# Patient Record
Sex: MALE | Race: WHITE | NOT HISPANIC OR LATINO | Employment: OTHER | URBAN - METROPOLITAN AREA
[De-identification: names, ages, dates, MRNs, and addresses within clinical notes are randomized per-mention and may not be internally consistent; named-entity substitution may affect disease eponyms.]

---

## 2021-11-09 ENCOUNTER — HOSPITAL ENCOUNTER (OUTPATIENT)
Facility: CLINIC | Age: 66
End: 2021-11-09
Attending: ORTHOPAEDIC SURGERY | Admitting: ORTHOPAEDIC SURGERY
Payer: MEDICARE

## 2022-02-08 DIAGNOSIS — Z11.59 ENCOUNTER FOR SCREENING FOR OTHER VIRAL DISEASES: Primary | ICD-10-CM

## 2022-02-09 ENCOUNTER — TRANSFERRED RECORDS (OUTPATIENT)
Dept: MULTI SPECIALTY CLINIC | Facility: CLINIC | Age: 67
End: 2022-02-09
Payer: MEDICARE

## 2022-02-09 LAB — HBA1C MFR BLD: 6.9 %

## 2022-02-23 RX ORDER — CEFAZOLIN SODIUM/WATER 2 G/20 ML
2 SYRINGE (ML) INTRAVENOUS SEE ADMIN INSTRUCTIONS
Status: CANCELLED | OUTPATIENT
Start: 2022-02-23

## 2022-02-23 RX ORDER — ACETAMINOPHEN 325 MG/1
975 TABLET ORAL ONCE
Status: CANCELLED | OUTPATIENT
Start: 2022-02-23 | End: 2022-02-23

## 2022-02-23 RX ORDER — CEFAZOLIN SODIUM/WATER 2 G/20 ML
2 SYRINGE (ML) INTRAVENOUS
Status: CANCELLED | OUTPATIENT
Start: 2022-02-23

## 2022-02-23 RX ORDER — TRANEXAMIC ACID 650 MG/1
1950 TABLET ORAL ONCE
Status: CANCELLED | OUTPATIENT
Start: 2022-02-23 | End: 2022-02-23

## 2022-02-23 RX ORDER — CELECOXIB 200 MG/1
400 CAPSULE ORAL ONCE
Status: CANCELLED | OUTPATIENT
Start: 2022-02-23 | End: 2022-02-23

## 2022-02-25 ENCOUNTER — LAB (OUTPATIENT)
Dept: LAB | Facility: CLINIC | Age: 67
End: 2022-02-25
Attending: ORTHOPAEDIC SURGERY
Payer: MEDICARE

## 2022-02-25 DIAGNOSIS — Z11.59 ENCOUNTER FOR SCREENING FOR OTHER VIRAL DISEASES: ICD-10-CM

## 2022-02-25 PROCEDURE — U0005 INFEC AGEN DETEC AMPLI PROBE: HCPCS

## 2022-02-25 PROCEDURE — U0003 INFECTIOUS AGENT DETECTION BY NUCLEIC ACID (DNA OR RNA); SEVERE ACUTE RESPIRATORY SYNDROME CORONAVIRUS 2 (SARS-COV-2) (CORONAVIRUS DISEASE [COVID-19]), AMPLIFIED PROBE TECHNIQUE, MAKING USE OF HIGH THROUGHPUT TECHNOLOGIES AS DESCRIBED BY CMS-2020-01-R: HCPCS

## 2022-02-26 LAB — SARS-COV-2 RNA RESP QL NAA+PROBE: NEGATIVE

## 2022-02-28 RX ORDER — TADALAFIL 5 MG/1
5 TABLET ORAL
COMMUNITY
Start: 2021-10-06 | End: 2022-06-30 | Stop reason: HOSPADM

## 2022-02-28 RX ORDER — ALBUTEROL SULFATE 90 UG/1
2 AEROSOL, METERED RESPIRATORY (INHALATION) EVERY 4 HOURS PRN
COMMUNITY
Start: 2021-03-17 | End: 2022-06-30 | Stop reason: HOSPADM

## 2022-02-28 RX ORDER — VALACYCLOVIR HYDROCHLORIDE 1 G/1
1 TABLET, FILM COATED ORAL 2 TIMES DAILY
COMMUNITY

## 2022-02-28 RX ORDER — CEPHALEXIN 500 MG/1
500 TABLET ORAL 3 TIMES DAILY
COMMUNITY
Start: 2022-02-25 | End: 2022-03-07

## 2022-03-26 DIAGNOSIS — Z11.59 ENCOUNTER FOR SCREENING FOR OTHER VIRAL DISEASES: Primary | ICD-10-CM

## 2022-04-17 ENCOUNTER — HEALTH MAINTENANCE LETTER (OUTPATIENT)
Age: 67
End: 2022-04-17

## 2022-05-04 RX ORDER — ACETAMINOPHEN 325 MG/1
975 TABLET ORAL ONCE
Status: CANCELLED | OUTPATIENT
Start: 2022-05-04 | End: 2022-05-04

## 2022-05-04 RX ORDER — CELECOXIB 200 MG/1
400 CAPSULE ORAL ONCE
Status: CANCELLED | OUTPATIENT
Start: 2022-05-04 | End: 2022-05-04

## 2022-05-04 RX ORDER — TRANEXAMIC ACID 650 MG/1
1950 TABLET ORAL ONCE
Status: CANCELLED | OUTPATIENT
Start: 2022-05-04 | End: 2022-05-04

## 2022-05-04 RX ORDER — CEFAZOLIN SODIUM/WATER 2 G/20 ML
2 SYRINGE (ML) INTRAVENOUS
Status: CANCELLED | OUTPATIENT
Start: 2022-05-04

## 2022-05-04 RX ORDER — CEFAZOLIN SODIUM/WATER 2 G/20 ML
2 SYRINGE (ML) INTRAVENOUS SEE ADMIN INSTRUCTIONS
Status: CANCELLED | OUTPATIENT
Start: 2022-05-04

## 2022-05-06 ENCOUNTER — LAB (OUTPATIENT)
Dept: LAB | Facility: CLINIC | Age: 67
End: 2022-05-06
Attending: ORTHOPAEDIC SURGERY
Payer: MEDICARE

## 2022-05-06 DIAGNOSIS — Z11.59 ENCOUNTER FOR SCREENING FOR OTHER VIRAL DISEASES: ICD-10-CM

## 2022-05-06 PROCEDURE — U0005 INFEC AGEN DETEC AMPLI PROBE: HCPCS

## 2022-05-06 PROCEDURE — U0003 INFECTIOUS AGENT DETECTION BY NUCLEIC ACID (DNA OR RNA); SEVERE ACUTE RESPIRATORY SYNDROME CORONAVIRUS 2 (SARS-COV-2) (CORONAVIRUS DISEASE [COVID-19]), AMPLIFIED PROBE TECHNIQUE, MAKING USE OF HIGH THROUGHPUT TECHNOLOGIES AS DESCRIBED BY CMS-2020-01-R: HCPCS

## 2022-05-07 ENCOUNTER — TELEPHONE (OUTPATIENT)
Dept: LAB | Facility: CLINIC | Age: 67
End: 2022-05-07
Payer: MEDICARE

## 2022-05-07 LAB — SARS-COV-2 RNA RESP QL NAA+PROBE: POSITIVE

## 2022-05-07 NOTE — TELEPHONE ENCOUNTER
PRE-SURGERY PATIENT              Patient classified as COVID treatment eligible by Epic high risk algorithm:  Yes    Coronavirus (COVID-19) Notification    Reason for call  Notify of POSITIVE COVID-19 lab result, assess symptoms,  review [facility] recommendations    Lab Result   Lab test for 2019-nCoV rRt-PCR or SARS-COV-2 PCR  Oropharyngeal AND/OR nasopharyngeal swabs were POSITIVE for 2019-nCoV RNA [OR] SARS-COV-2 RNA (COVID-19) RNA     We have been unable to reach patient by phone at this time to notify of their Positive COVID-19 result.    Left voicemail message requesting a call back to [phone] [facility] for results.        A Positive COVID-19 letter will be sent via [employer] or the mail. (Exception, no letters sent to Presurgerical/Preprocedure Patients)    Maribel Watson LPN

## 2022-05-07 NOTE — TELEPHONE ENCOUNTER
Coronavirus (COVID-19) Notification    Caller Name (Patient, parent, daughter/son, grandparent, etc)  patient    Reason for call  Notify of Positive Coronavirus (COVID-19) lab results, assess symptoms,  review [facility] recommendations    Lab Result    Lab test:  2019-nCoV rRt-PCR or SARS-CoV-2 PCR    Oropharyngeal AND/OR nasopharyngeal swabs is POSITIVE for 2019-nCoV RNA/SARS-COV-2 PCR (COVID-19 virus)      Gather patient reported symptoms   Assessment   Current Symptoms at time of phone call, reported by patient: (if no symptoms, document: No symptoms] No symptoms   Date of symptom(s) onset (if applicable) n/a     If at time of call, Patients symptoms have worsened, the Patient should contact 911 or have someone drive them to Emergency Dept promptly:      If Patient calling 911, inform 911 personal that you have tested positive for the Coronavirus (COVID-19).  Place mask on and await 911 to arrive.    If Emergency Dept, If possible, please have another adult drive you to the Emergency Dept but you need to wear mask when in contact with other people.      Treatment Options:   Patient classified as COVID treatment eligible by Epic high risk algorithm: Yes  Is the patient symptomatic at the time of result notification? No    Review information with Patient    Your result was positive. This means you have COVID-19 (coronavirus).    How can I protect others?    These guidelines are for isolating before returning to work, school or .    If you DO have symptoms    Stay home and away from others     For at least 5 days after your symptoms started, AND    You are fever free for 24 hours (with no medicine that reduces fever), AND    Your other symptoms are better    Wear a mask for 10 full days anytime you are around others    If you DON'T have symptoms    Stay home and away from others for at least 5 days after your positive test    Wear a mask for 10 full days anytime you are around others    There may be  different guidelines for healthcare facilities.  Please check with the specific sites before arriving.    If you have been told by a doctor that you were severely ill with COVID-19 or are immunocompromised, you should isolate for at least 10 days.    You should not go back to work until you meet the guidelines above for ending your home isolation. You don't need to be retested for COVID-19 before going back to work--studies show that you won't spread the virus if it's been at least 10 days since your symptoms started (or 20 days, if you have a weak immune system).    Employers, schools, and daycares: This is an official notice for this person's medical guidelines for returning in-person.  They must meet the above guidelines before going back to work, school or  in person.    You will receive a positive COVID-19 letter via [employer] or the mail soon with additional self-care information (exception, no letters will be sent to presurgical/preprocedure patients).    Would you like me to review some of that information with you now?  No    If you were tested for an upcoming procedure, please contact your provider for next steps.    Maribel Watson LPN

## 2022-06-29 RX ORDER — CEFAZOLIN SODIUM/WATER 2 G/20 ML
2 SYRINGE (ML) INTRAVENOUS SEE ADMIN INSTRUCTIONS
Status: CANCELLED | OUTPATIENT
Start: 2022-06-29

## 2022-06-29 RX ORDER — TRANEXAMIC ACID 650 MG/1
1950 TABLET ORAL ONCE
Status: CANCELLED | OUTPATIENT
Start: 2022-06-29 | End: 2022-06-29

## 2022-06-29 RX ORDER — CEFAZOLIN SODIUM/WATER 2 G/20 ML
2 SYRINGE (ML) INTRAVENOUS
Status: CANCELLED | OUTPATIENT
Start: 2022-06-29

## 2022-06-29 RX ORDER — ACETAMINOPHEN 325 MG/1
975 TABLET ORAL ONCE
Status: CANCELLED | OUTPATIENT
Start: 2022-06-29 | End: 2022-06-29

## 2022-06-29 RX ORDER — CELECOXIB 200 MG/1
400 CAPSULE ORAL ONCE
Status: CANCELLED | OUTPATIENT
Start: 2022-06-29 | End: 2022-06-29

## 2022-06-29 NOTE — PROGRESS NOTES
Pre-op Total Joint Patient Screening    1. Do you have a ride available to come to the hospital the day after your surgery by 8am with anticipated discharge of 11am? Y(discharge time not provided since it is an afternoon surgery)  2. What is the name of this person? Y  3. Do you have a  set up after surgery? Y  4. Will your  be the same person that gives you a ride home after surgery? Y  5. Have you received the Joint Replacement Guidebook? Y  6. Do you have any questions about your guidebook? N  7. Have you activated your [employer] account? Y  8. Have you signed up for MY Chart access? Y

## 2022-10-23 ENCOUNTER — HEALTH MAINTENANCE LETTER (OUTPATIENT)
Age: 67
End: 2022-10-23

## 2023-06-01 ENCOUNTER — HEALTH MAINTENANCE LETTER (OUTPATIENT)
Age: 68
End: 2023-06-01

## 2023-08-07 RX ORDER — TADALAFIL 5 MG/1
5 TABLET ORAL DAILY PRN
COMMUNITY

## 2023-08-07 RX ORDER — ALBUTEROL SULFATE 90 UG/1
2 AEROSOL, METERED RESPIRATORY (INHALATION) EVERY 4 HOURS PRN
COMMUNITY

## 2023-08-08 ENCOUNTER — ANESTHESIA (OUTPATIENT)
Dept: SURGERY | Facility: CLINIC | Age: 68
End: 2023-08-08
Payer: MEDICARE

## 2023-08-08 ENCOUNTER — HOSPITAL ENCOUNTER (OUTPATIENT)
Facility: CLINIC | Age: 68
Discharge: HOME | End: 2023-08-10
Attending: ORTHOPAEDIC SURGERY | Admitting: ORTHOPAEDIC SURGERY
Payer: MEDICARE

## 2023-08-08 ENCOUNTER — APPOINTMENT (OUTPATIENT)
Dept: GENERAL RADIOLOGY | Facility: CLINIC | Age: 68
End: 2023-08-08
Attending: PHYSICIAN ASSISTANT
Payer: MEDICARE

## 2023-08-08 ENCOUNTER — ANESTHESIA EVENT (OUTPATIENT)
Dept: SURGERY | Facility: CLINIC | Age: 68
End: 2023-08-08
Payer: MEDICARE

## 2023-08-08 ENCOUNTER — APPOINTMENT (OUTPATIENT)
Dept: PHYSICAL THERAPY | Facility: CLINIC | Age: 68
End: 2023-08-08
Attending: ORTHOPAEDIC SURGERY
Payer: MEDICARE

## 2023-08-08 DIAGNOSIS — Z98.890 POST-OPERATIVE STATE: Primary | ICD-10-CM

## 2023-08-08 DIAGNOSIS — Z79.1 PATIENT TAKES NSAID (NON-STEROID ANTI-INFLAMMATORY DRUG): ICD-10-CM

## 2023-08-08 PROBLEM — Z96.649 S/P TOTAL HIP ARTHROPLASTY: Status: ACTIVE | Noted: 2023-08-08

## 2023-08-08 LAB
CREAT SERPL-MCNC: 1 MG/DL (ref 0.67–1.17)
FASTING STATUS PATIENT QL REPORTED: NO
GFR SERPL CREATININE-BSD FRML MDRD: 81 ML/MIN/1.73M2
GLUCOSE BLDC GLUCOMTR-MCNC: 137 MG/DL (ref 70–99)
GLUCOSE BLDC GLUCOMTR-MCNC: 162 MG/DL (ref 70–99)
GLUCOSE BLDC GLUCOMTR-MCNC: 200 MG/DL (ref 70–99)
GLUCOSE SERPL-MCNC: 161 MG/DL (ref 70–99)
POTASSIUM SERPL-SCNC: 4.3 MMOL/L (ref 3.4–5.3)

## 2023-08-08 PROCEDURE — C1713 ANCHOR/SCREW BN/BN,TIS/BN: HCPCS | Performed by: ORTHOPAEDIC SURGERY

## 2023-08-08 PROCEDURE — 97116 GAIT TRAINING THERAPY: CPT | Mod: GP

## 2023-08-08 PROCEDURE — C1776 JOINT DEVICE (IMPLANTABLE): HCPCS | Performed by: ORTHOPAEDIC SURGERY

## 2023-08-08 PROCEDURE — 250N000011 HC RX IP 250 OP 636: Performed by: NURSE ANESTHETIST, CERTIFIED REGISTERED

## 2023-08-08 PROCEDURE — 250N000011 HC RX IP 250 OP 636: Performed by: ANESTHESIOLOGY

## 2023-08-08 PROCEDURE — 250N000011 HC RX IP 250 OP 636: Performed by: ORTHOPAEDIC SURGERY

## 2023-08-08 PROCEDURE — 370N000017 HC ANESTHESIA TECHNICAL FEE, PER MIN: Performed by: ORTHOPAEDIC SURGERY

## 2023-08-08 PROCEDURE — 258N000003 HC RX IP 258 OP 636: Performed by: ANESTHESIOLOGY

## 2023-08-08 PROCEDURE — 250N000011 HC RX IP 250 OP 636: Mod: JZ | Performed by: PHYSICIAN ASSISTANT

## 2023-08-08 PROCEDURE — 250N000009 HC RX 250: Performed by: NURSE ANESTHETIST, CERTIFIED REGISTERED

## 2023-08-08 PROCEDURE — 250N000011 HC RX IP 250 OP 636: Mod: JZ | Performed by: NURSE ANESTHETIST, CERTIFIED REGISTERED

## 2023-08-08 PROCEDURE — 999N000141 HC STATISTIC PRE-PROCEDURE NURSING ASSESSMENT: Performed by: ORTHOPAEDIC SURGERY

## 2023-08-08 PROCEDURE — 84132 ASSAY OF SERUM POTASSIUM: CPT | Performed by: ANESTHESIOLOGY

## 2023-08-08 PROCEDURE — 258N000001 HC RX 258: Performed by: ORTHOPAEDIC SURGERY

## 2023-08-08 PROCEDURE — 82962 GLUCOSE BLOOD TEST: CPT

## 2023-08-08 PROCEDURE — 82947 ASSAY GLUCOSE BLOOD QUANT: CPT | Performed by: ANESTHESIOLOGY

## 2023-08-08 PROCEDURE — 250N000012 HC RX MED GY IP 250 OP 636 PS 637: Performed by: PHYSICIAN ASSISTANT

## 2023-08-08 PROCEDURE — 97161 PT EVAL LOW COMPLEX 20 MIN: CPT | Mod: GP

## 2023-08-08 PROCEDURE — 710N000009 HC RECOVERY PHASE 1, LEVEL 1, PER MIN: Performed by: ORTHOPAEDIC SURGERY

## 2023-08-08 PROCEDURE — 250N000013 HC RX MED GY IP 250 OP 250 PS 637: Performed by: PHYSICIAN ASSISTANT

## 2023-08-08 PROCEDURE — 272N000001 HC OR GENERAL SUPPLY STERILE: Performed by: ORTHOPAEDIC SURGERY

## 2023-08-08 PROCEDURE — 82565 ASSAY OF CREATININE: CPT | Performed by: ANESTHESIOLOGY

## 2023-08-08 PROCEDURE — 99207 PR NO CHARGE LOS: CPT | Performed by: PHYSICIAN ASSISTANT

## 2023-08-08 PROCEDURE — 360N000077 HC SURGERY LEVEL 4, PER MIN: Performed by: ORTHOPAEDIC SURGERY

## 2023-08-08 PROCEDURE — 73502 X-RAY EXAM HIP UNI 2-3 VIEWS: CPT

## 2023-08-08 PROCEDURE — 258N000003 HC RX IP 258 OP 636: Performed by: PHYSICIAN ASSISTANT

## 2023-08-08 PROCEDURE — 258N000003 HC RX IP 258 OP 636: Performed by: NURSE ANESTHETIST, CERTIFIED REGISTERED

## 2023-08-08 PROCEDURE — 250N000011 HC RX IP 250 OP 636: Performed by: PHYSICIAN ASSISTANT

## 2023-08-08 PROCEDURE — 97530 THERAPEUTIC ACTIVITIES: CPT | Mod: GP

## 2023-08-08 PROCEDURE — 250N000009 HC RX 250: Performed by: ORTHOPAEDIC SURGERY

## 2023-08-08 DEVICE — R3 3 HOLE ACETABULAR SHELL 52MM
Type: IMPLANTABLE DEVICE | Site: HIP | Status: FUNCTIONAL
Brand: R3 ACETABULAR

## 2023-08-08 DEVICE — REFLECTION THREADED HOLE COVER
Type: IMPLANTABLE DEVICE | Site: HIP | Status: FUNCTIONAL
Brand: REFLECTION

## 2023-08-08 DEVICE — REFLECTION SPHERICAL HEAD SCREW 40MM
Type: IMPLANTABLE DEVICE | Site: HIP | Status: FUNCTIONAL
Brand: REFLECTION

## 2023-08-08 DEVICE — R3 0 DEGREE XLPE ACETABULAR LINER                                    36MM INNER DIAMETER X OUTER DIAMETER 52MM
Type: IMPLANTABLE DEVICE | Site: HIP | Status: FUNCTIONAL
Brand: R3

## 2023-08-08 DEVICE — ANTHOLOGY AFIT HIGH OFFSET POROUS                                    PLUS HA SIZE 7
Type: IMPLANTABLE DEVICE | Site: HIP | Status: FUNCTIONAL
Brand: ANTHOLOGY AFIT

## 2023-08-08 DEVICE — BIOLOX DELTA HEAD 36 MM 12/14                                    SHORT / +0
Type: IMPLANTABLE DEVICE | Site: HIP | Status: FUNCTIONAL
Brand: BIOLOX DELTA

## 2023-08-08 DEVICE — REFLECTION SPHERICAL HEAD SCREW 25MM
Type: IMPLANTABLE DEVICE | Site: HIP | Status: FUNCTIONAL
Brand: REFLECTION

## 2023-08-08 RX ORDER — POLYETHYLENE GLYCOL 3350 17 G/17G
17 POWDER, FOR SOLUTION ORAL DAILY
Status: DISCONTINUED | OUTPATIENT
Start: 2023-08-09 | End: 2023-08-10 | Stop reason: HOSPADM

## 2023-08-08 RX ORDER — TRANEXAMIC ACID 650 MG/1
1950 TABLET ORAL ONCE
Status: COMPLETED | OUTPATIENT
Start: 2023-08-08 | End: 2023-08-08

## 2023-08-08 RX ORDER — CELECOXIB 100 MG/1
100 CAPSULE ORAL 2 TIMES DAILY
Status: COMPLETED | OUTPATIENT
Start: 2023-08-08 | End: 2023-08-10

## 2023-08-08 RX ORDER — ASPIRIN 81 MG/1
162 TABLET ORAL DAILY
Status: DISCONTINUED | OUTPATIENT
Start: 2023-08-08 | End: 2023-08-10 | Stop reason: HOSPADM

## 2023-08-08 RX ORDER — ONDANSETRON 4 MG/1
4 TABLET, ORALLY DISINTEGRATING ORAL EVERY 30 MIN PRN
Status: DISCONTINUED | OUTPATIENT
Start: 2023-08-08 | End: 2023-08-08 | Stop reason: HOSPADM

## 2023-08-08 RX ORDER — HYDROMORPHONE HCL IN WATER/PF 6 MG/30 ML
0.4 PATIENT CONTROLLED ANALGESIA SYRINGE INTRAVENOUS
Status: DISCONTINUED | OUTPATIENT
Start: 2023-08-08 | End: 2023-08-10 | Stop reason: HOSPADM

## 2023-08-08 RX ORDER — HYDROMORPHONE HCL IN WATER/PF 6 MG/30 ML
0.2 PATIENT CONTROLLED ANALGESIA SYRINGE INTRAVENOUS EVERY 5 MIN PRN
Status: DISCONTINUED | OUTPATIENT
Start: 2023-08-08 | End: 2023-08-08 | Stop reason: HOSPADM

## 2023-08-08 RX ORDER — PROCHLORPERAZINE MALEATE 5 MG
5 TABLET ORAL EVERY 6 HOURS PRN
Status: DISCONTINUED | OUTPATIENT
Start: 2023-08-08 | End: 2023-08-10 | Stop reason: HOSPADM

## 2023-08-08 RX ORDER — VANCOMYCIN HYDROCHLORIDE 1 G/20ML
INJECTION, POWDER, LYOPHILIZED, FOR SOLUTION INTRAVENOUS PRN
Status: DISCONTINUED | OUTPATIENT
Start: 2023-08-08 | End: 2023-08-08 | Stop reason: HOSPADM

## 2023-08-08 RX ORDER — SODIUM CHLORIDE, SODIUM LACTATE, POTASSIUM CHLORIDE, CALCIUM CHLORIDE 600; 310; 30; 20 MG/100ML; MG/100ML; MG/100ML; MG/100ML
INJECTION, SOLUTION INTRAVENOUS CONTINUOUS
Status: DISCONTINUED | OUTPATIENT
Start: 2023-08-08 | End: 2023-08-10 | Stop reason: HOSPADM

## 2023-08-08 RX ORDER — CEFAZOLIN SODIUM/WATER 2 G/20 ML
2 SYRINGE (ML) INTRAVENOUS
Status: COMPLETED | OUTPATIENT
Start: 2023-08-08 | End: 2023-08-08

## 2023-08-08 RX ORDER — LIDOCAINE 40 MG/G
CREAM TOPICAL
Status: DISCONTINUED | OUTPATIENT
Start: 2023-08-08 | End: 2023-08-10 | Stop reason: HOSPADM

## 2023-08-08 RX ORDER — SODIUM CHLORIDE, SODIUM LACTATE, POTASSIUM CHLORIDE, CALCIUM CHLORIDE 600; 310; 30; 20 MG/100ML; MG/100ML; MG/100ML; MG/100ML
INJECTION, SOLUTION INTRAVENOUS CONTINUOUS
Status: DISCONTINUED | OUTPATIENT
Start: 2023-08-08 | End: 2023-08-08 | Stop reason: HOSPADM

## 2023-08-08 RX ORDER — ASPIRIN 81 MG/1
162 TABLET ORAL DAILY
Qty: 120 TABLET | Refills: 0 | Status: SHIPPED | OUTPATIENT
Start: 2023-08-08

## 2023-08-08 RX ORDER — ACETAMINOPHEN 325 MG/1
975 TABLET ORAL EVERY 8 HOURS
Status: DISCONTINUED | OUTPATIENT
Start: 2023-08-08 | End: 2023-08-10 | Stop reason: HOSPADM

## 2023-08-08 RX ORDER — ONDANSETRON 2 MG/ML
INJECTION INTRAMUSCULAR; INTRAVENOUS PRN
Status: DISCONTINUED | OUTPATIENT
Start: 2023-08-08 | End: 2023-08-08

## 2023-08-08 RX ORDER — AMOXICILLIN 250 MG
1-2 CAPSULE ORAL 2 TIMES DAILY
Qty: 100 TABLET | Refills: 0 | Status: SHIPPED | OUTPATIENT
Start: 2023-08-08

## 2023-08-08 RX ORDER — CEFAZOLIN SODIUM/WATER 2 G/20 ML
2 SYRINGE (ML) INTRAVENOUS EVERY 8 HOURS
Status: DISCONTINUED | OUTPATIENT
Start: 2023-08-08 | End: 2023-08-08

## 2023-08-08 RX ORDER — HYDROMORPHONE HCL IN WATER/PF 6 MG/30 ML
0.2 PATIENT CONTROLLED ANALGESIA SYRINGE INTRAVENOUS
Status: DISCONTINUED | OUTPATIENT
Start: 2023-08-08 | End: 2023-08-10 | Stop reason: HOSPADM

## 2023-08-08 RX ORDER — CEFAZOLIN SODIUM 2 G/100ML
2 INJECTION, SOLUTION INTRAVENOUS EVERY 8 HOURS
Status: COMPLETED | OUTPATIENT
Start: 2023-08-08 | End: 2023-08-09

## 2023-08-08 RX ORDER — ACETAMINOPHEN 325 MG/1
650 TABLET ORAL EVERY 4 HOURS PRN
Qty: 100 TABLET | Refills: 0 | Status: SHIPPED | OUTPATIENT
Start: 2023-08-08

## 2023-08-08 RX ORDER — ONDANSETRON 2 MG/ML
4 INJECTION INTRAMUSCULAR; INTRAVENOUS EVERY 30 MIN PRN
Status: DISCONTINUED | OUTPATIENT
Start: 2023-08-08 | End: 2023-08-08 | Stop reason: HOSPADM

## 2023-08-08 RX ORDER — NALOXONE HYDROCHLORIDE 0.4 MG/ML
0.2 INJECTION, SOLUTION INTRAMUSCULAR; INTRAVENOUS; SUBCUTANEOUS
Status: DISCONTINUED | OUTPATIENT
Start: 2023-08-08 | End: 2023-08-10 | Stop reason: HOSPADM

## 2023-08-08 RX ORDER — FENTANYL CITRATE 0.05 MG/ML
50 INJECTION, SOLUTION INTRAMUSCULAR; INTRAVENOUS EVERY 5 MIN PRN
Status: DISCONTINUED | OUTPATIENT
Start: 2023-08-08 | End: 2023-08-08 | Stop reason: HOSPADM

## 2023-08-08 RX ORDER — AMOXICILLIN 250 MG
1 CAPSULE ORAL 2 TIMES DAILY
Status: DISCONTINUED | OUTPATIENT
Start: 2023-08-08 | End: 2023-08-10 | Stop reason: HOSPADM

## 2023-08-08 RX ORDER — BISACODYL 10 MG
10 SUPPOSITORY, RECTAL RECTAL DAILY PRN
Status: DISCONTINUED | OUTPATIENT
Start: 2023-08-08 | End: 2023-08-10 | Stop reason: HOSPADM

## 2023-08-08 RX ORDER — ONDANSETRON 4 MG/1
4 TABLET, ORALLY DISINTEGRATING ORAL EVERY 6 HOURS PRN
Status: DISCONTINUED | OUTPATIENT
Start: 2023-08-08 | End: 2023-08-10 | Stop reason: HOSPADM

## 2023-08-08 RX ORDER — NALOXONE HYDROCHLORIDE 0.4 MG/ML
0.4 INJECTION, SOLUTION INTRAMUSCULAR; INTRAVENOUS; SUBCUTANEOUS
Status: DISCONTINUED | OUTPATIENT
Start: 2023-08-08 | End: 2023-08-10 | Stop reason: HOSPADM

## 2023-08-08 RX ORDER — OXYCODONE HYDROCHLORIDE 5 MG/1
10 TABLET ORAL EVERY 4 HOURS PRN
Status: DISCONTINUED | OUTPATIENT
Start: 2023-08-08 | End: 2023-08-10 | Stop reason: HOSPADM

## 2023-08-08 RX ORDER — NICOTINE POLACRILEX 4 MG
15-30 LOZENGE BUCCAL
Status: DISCONTINUED | OUTPATIENT
Start: 2023-08-08 | End: 2023-08-10 | Stop reason: HOSPADM

## 2023-08-08 RX ORDER — CEFAZOLIN SODIUM/WATER 2 G/20 ML
2 SYRINGE (ML) INTRAVENOUS SEE ADMIN INSTRUCTIONS
Status: DISCONTINUED | OUTPATIENT
Start: 2023-08-08 | End: 2023-08-08 | Stop reason: HOSPADM

## 2023-08-08 RX ORDER — PROPOFOL 10 MG/ML
INJECTION, EMULSION INTRAVENOUS CONTINUOUS PRN
Status: DISCONTINUED | OUTPATIENT
Start: 2023-08-08 | End: 2023-08-08

## 2023-08-08 RX ORDER — FENTANYL CITRATE 0.05 MG/ML
25 INJECTION, SOLUTION INTRAMUSCULAR; INTRAVENOUS EVERY 5 MIN PRN
Status: DISCONTINUED | OUTPATIENT
Start: 2023-08-08 | End: 2023-08-08 | Stop reason: HOSPADM

## 2023-08-08 RX ORDER — ACETAMINOPHEN 500 MG
500-1000 TABLET ORAL EVERY 6 HOURS PRN
Status: ON HOLD | COMMUNITY
End: 2023-08-08

## 2023-08-08 RX ORDER — HYDROMORPHONE HCL IN WATER/PF 6 MG/30 ML
0.4 PATIENT CONTROLLED ANALGESIA SYRINGE INTRAVENOUS EVERY 5 MIN PRN
Status: DISCONTINUED | OUTPATIENT
Start: 2023-08-08 | End: 2023-08-08 | Stop reason: HOSPADM

## 2023-08-08 RX ORDER — PANTOPRAZOLE SODIUM 40 MG/1
40 TABLET, DELAYED RELEASE ORAL DAILY
Qty: 60 TABLET | Refills: 0 | Status: SHIPPED | OUTPATIENT
Start: 2023-08-08

## 2023-08-08 RX ORDER — GLYCOPYRROLATE 0.2 MG/ML
INJECTION, SOLUTION INTRAMUSCULAR; INTRAVENOUS PRN
Status: DISCONTINUED | OUTPATIENT
Start: 2023-08-08 | End: 2023-08-08

## 2023-08-08 RX ORDER — CELECOXIB 200 MG/1
400 CAPSULE ORAL ONCE
Status: COMPLETED | OUTPATIENT
Start: 2023-08-08 | End: 2023-08-08

## 2023-08-08 RX ORDER — OXYCODONE HYDROCHLORIDE 5 MG/1
5 TABLET ORAL EVERY 4 HOURS PRN
Status: DISCONTINUED | OUTPATIENT
Start: 2023-08-08 | End: 2023-08-10 | Stop reason: HOSPADM

## 2023-08-08 RX ORDER — DEXTROSE MONOHYDRATE 25 G/50ML
25-50 INJECTION, SOLUTION INTRAVENOUS
Status: DISCONTINUED | OUTPATIENT
Start: 2023-08-08 | End: 2023-08-10 | Stop reason: HOSPADM

## 2023-08-08 RX ORDER — HYDROXYZINE HYDROCHLORIDE 10 MG/1
10 TABLET, FILM COATED ORAL EVERY 6 HOURS PRN
Status: DISCONTINUED | OUTPATIENT
Start: 2023-08-08 | End: 2023-08-10 | Stop reason: HOSPADM

## 2023-08-08 RX ORDER — DIPHENHYDRAMINE HCL 12.5MG/5ML
12.5 LIQUID (ML) ORAL EVERY 6 HOURS PRN
Status: DISCONTINUED | OUTPATIENT
Start: 2023-08-08 | End: 2023-08-10 | Stop reason: HOSPADM

## 2023-08-08 RX ORDER — LIDOCAINE HYDROCHLORIDE 20 MG/ML
INJECTION, SOLUTION INFILTRATION; PERINEURAL PRN
Status: DISCONTINUED | OUTPATIENT
Start: 2023-08-08 | End: 2023-08-08

## 2023-08-08 RX ORDER — CELECOXIB 100 MG/1
100 CAPSULE ORAL DAILY
Qty: 30 CAPSULE | Refills: 0 | Status: SHIPPED | OUTPATIENT
Start: 2023-08-08

## 2023-08-08 RX ORDER — ACETAMINOPHEN 325 MG/1
975 TABLET ORAL ONCE
Status: COMPLETED | OUTPATIENT
Start: 2023-08-08 | End: 2023-08-08

## 2023-08-08 RX ORDER — OXYCODONE HYDROCHLORIDE 5 MG/1
5 TABLET ORAL EVERY 4 HOURS PRN
Qty: 26 TABLET | Refills: 0 | Status: SHIPPED | OUTPATIENT
Start: 2023-08-08

## 2023-08-08 RX ORDER — ONDANSETRON 2 MG/ML
4 INJECTION INTRAMUSCULAR; INTRAVENOUS EVERY 6 HOURS PRN
Status: DISCONTINUED | OUTPATIENT
Start: 2023-08-08 | End: 2023-08-10 | Stop reason: HOSPADM

## 2023-08-08 RX ADMIN — ONDANSETRON 4 MG: 2 INJECTION INTRAMUSCULAR; INTRAVENOUS at 11:29

## 2023-08-08 RX ADMIN — FENTANYL CITRATE 25 MCG: 50 INJECTION, SOLUTION INTRAMUSCULAR; INTRAVENOUS at 13:35

## 2023-08-08 RX ADMIN — INSULIN ASPART 2 UNITS: 100 INJECTION, SOLUTION INTRAVENOUS; SUBCUTANEOUS at 18:17

## 2023-08-08 RX ADMIN — PHENYLEPHRINE HYDROCHLORIDE 100 MCG: 10 INJECTION INTRAVENOUS at 10:56

## 2023-08-08 RX ADMIN — PROPOFOL 100 MCG/KG/MIN: 10 INJECTION, EMULSION INTRAVENOUS at 10:19

## 2023-08-08 RX ADMIN — ACETAMINOPHEN 975 MG: 325 TABLET, FILM COATED ORAL at 16:05

## 2023-08-08 RX ADMIN — PHENYLEPHRINE HYDROCHLORIDE 100 MCG: 10 INJECTION INTRAVENOUS at 11:02

## 2023-08-08 RX ADMIN — ASPIRIN 162 MG: 81 TABLET, COATED ORAL at 14:48

## 2023-08-08 RX ADMIN — PHENYLEPHRINE HYDROCHLORIDE 150 MCG: 10 INJECTION INTRAVENOUS at 11:20

## 2023-08-08 RX ADMIN — CEFAZOLIN SODIUM 2 G: 2 INJECTION, SOLUTION INTRAVENOUS at 18:28

## 2023-08-08 RX ADMIN — CELECOXIB 100 MG: 100 CAPSULE ORAL at 20:57

## 2023-08-08 RX ADMIN — SODIUM CHLORIDE, POTASSIUM CHLORIDE, SODIUM LACTATE AND CALCIUM CHLORIDE: 600; 310; 30; 20 INJECTION, SOLUTION INTRAVENOUS at 09:38

## 2023-08-08 RX ADMIN — OXYCODONE HYDROCHLORIDE 5 MG: 5 TABLET ORAL at 14:51

## 2023-08-08 RX ADMIN — Medication 2 G: at 10:14

## 2023-08-08 RX ADMIN — MIDAZOLAM 2 MG: 1 INJECTION INTRAMUSCULAR; INTRAVENOUS at 10:08

## 2023-08-08 RX ADMIN — HYDROXYZINE HYDROCHLORIDE 10 MG: 10 TABLET ORAL at 16:06

## 2023-08-08 RX ADMIN — GLYCOPYRROLATE 0.2 MG: 0.2 INJECTION, SOLUTION INTRAMUSCULAR; INTRAVENOUS at 10:33

## 2023-08-08 RX ADMIN — PHENYLEPHRINE HYDROCHLORIDE 100 MCG: 10 INJECTION INTRAVENOUS at 10:44

## 2023-08-08 RX ADMIN — PHENYLEPHRINE HYDROCHLORIDE 100 MCG: 10 INJECTION INTRAVENOUS at 10:25

## 2023-08-08 RX ADMIN — PHENYLEPHRINE HYDROCHLORIDE 150 MCG: 10 INJECTION INTRAVENOUS at 11:14

## 2023-08-08 RX ADMIN — ACETAMINOPHEN 975 MG: 325 TABLET, FILM COATED ORAL at 08:33

## 2023-08-08 RX ADMIN — SODIUM CHLORIDE, POTASSIUM CHLORIDE, SODIUM LACTATE AND CALCIUM CHLORIDE: 600; 310; 30; 20 INJECTION, SOLUTION INTRAVENOUS at 11:15

## 2023-08-08 RX ADMIN — SODIUM CHLORIDE, POTASSIUM CHLORIDE, SODIUM LACTATE AND CALCIUM CHLORIDE: 600; 310; 30; 20 INJECTION, SOLUTION INTRAVENOUS at 14:52

## 2023-08-08 RX ADMIN — PHENYLEPHRINE HYDROCHLORIDE 100 MCG: 10 INJECTION INTRAVENOUS at 10:50

## 2023-08-08 RX ADMIN — SODIUM CHLORIDE, POTASSIUM CHLORIDE, SODIUM LACTATE AND CALCIUM CHLORIDE: 600; 310; 30; 20 INJECTION, SOLUTION INTRAVENOUS at 19:42

## 2023-08-08 RX ADMIN — CELECOXIB 400 MG: 200 CAPSULE ORAL at 08:33

## 2023-08-08 RX ADMIN — TRANEXAMIC ACID 1950 MG: 650 TABLET ORAL at 08:33

## 2023-08-08 RX ADMIN — PHENYLEPHRINE HYDROCHLORIDE 150 MCG: 10 INJECTION INTRAVENOUS at 11:26

## 2023-08-08 RX ADMIN — PHENYLEPHRINE HYDROCHLORIDE 100 MCG: 10 INJECTION INTRAVENOUS at 10:38

## 2023-08-08 RX ADMIN — OXYCODONE HYDROCHLORIDE 5 MG: 5 TABLET ORAL at 21:12

## 2023-08-08 RX ADMIN — LIDOCAINE HYDROCHLORIDE 40 MG: 20 INJECTION, SOLUTION INFILTRATION; PERINEURAL at 10:19

## 2023-08-08 RX ADMIN — PHENYLEPHRINE HYDROCHLORIDE 200 MCG: 10 INJECTION INTRAVENOUS at 11:32

## 2023-08-08 RX ADMIN — MEPIVACAINE HYDROCHLORIDE 2.5 ML: 20 INJECTION, SOLUTION EPIDURAL; INFILTRATION at 10:18

## 2023-08-08 ASSESSMENT — LIFESTYLE VARIABLES: TOBACCO_USE: 0

## 2023-08-08 ASSESSMENT — ACTIVITIES OF DAILY LIVING (ADL)
ADLS_ACUITY_SCORE: 22
ADLS_ACUITY_SCORE: 19
ADLS_ACUITY_SCORE: 22
ADLS_ACUITY_SCORE: 19
ADLS_ACUITY_SCORE: 22
ADLS_ACUITY_SCORE: 19
ADLS_ACUITY_SCORE: 22
ADLS_ACUITY_SCORE: 19

## 2023-08-08 ASSESSMENT — COPD QUESTIONNAIRES: COPD: 0

## 2023-08-08 NOTE — ANESTHESIA PREPROCEDURE EVALUATION
Anesthesia Pre-Procedure Evaluation    Patient: Mp Garay   MRN: [id number] : 1955        Procedure : Procedure(s):  LEFT OPEN TOTAL HIP ARTHROPLASTY          Past Medical History:   Diagnosis Date    Diabetes (H)     Prostate cancer (H)     Tinnitus, bilateral       Past Surgical History:   Procedure Laterality Date    ENT SURGERY      ORTHOPEDIC SURGERY        Allergies   Allergen Reactions    Floxin Otic [Ofloxacin]       Social History     Tobacco Use    Smoking status: Never    Smokeless tobacco: Never   Substance Use Topics    Alcohol use: Yes     Comment: rarely      Wt Readings from Last 1 Encounters:   23 69.4 kg (152 lb 14.4 oz)        Anesthesia Evaluation            ROS/MED HX  ENT/Pulmonary:    (-) tobacco use, asthma and COPD   Neurologic:  - neg neurologic ROS     Cardiovascular:    (-) hypertension, CAD and dyslipidemia   METS/Exercise Tolerance:     Hematologic:       Musculoskeletal:   (+)  arthritis,             GI/Hepatic:  - neg GI/hepatic ROS     Renal/Genitourinary:       Endo:     (+)  type II DM,   Not using insulin, - not using insulin pump.                Psychiatric/Substance Use:  - neg psychiatric ROS     Infectious Disease:       Malignancy:   (+) Malignancy, History of Prostate.    Other:            Physical Exam    Airway        Mallampati: III   TM distance: > 3 FB   Neck ROM: full   Mouth opening: > 3 cm    Respiratory Devices and Support         Dental       (+) Minor Abnormalities - some fillings, tiny chips      Cardiovascular   cardiovascular exam normal          Pulmonary   pulmonary exam normal                OUTSIDE LABS:  CBC: No results found for: WBC, HGB, HCT, PLT  BMP: No results found for: NA, POTASSIUM, CHLORIDE, CO2, BUN, CR, GLC  COAGS: No results found for: PTT, INR, FIBR  POC: No results found for: BGM, HCG, HCGS  HEPATIC: No results found for: ALBUMIN, PROTTOTAL, ALT, AST, GGT, ALKPHOS, BILITOTAL, BILIDIRECT, COLLETTE  OTHER: No results found  for: PH, LACT, A1C, HUMBLE, PHOS, MAG, LIPASE, AMYLASE, TSH, T4, T3, CRP, SED    Anesthesia Plan    ASA Status:  2    NPO Status:  NPO Appropriate    Anesthesia Type: Spinal.   Induction: Intravenous, Propofol.           Consents    Anesthesia Plan(s) and associated risks, benefits, and realistic alternatives discussed. Questions answered and patient/representative(s) expressed understanding.     - Discussed:     - Discussed with:  Patient      - Extended Intubation/Ventilatory Support Discussed: No.      - Patient is DNR/DNI Status: No     Use of blood products discussed: Yes.     - Consented: consented to blood products            Reason for refusal: other.     Postoperative Care    Pain management: Multi-modal analgesia, Peripheral nerve block (Single Shot), IV analgesics.   PONV prophylaxis: Ondansetron (or other 5HT-3), Dexamethasone or Solumedrol     Comments:    Other Comments: Patient is counseled on the anesthesia plan and relevant anesthesia procedures including all risks and benefits. All patient questions were answered.               Geraldo Remy MD

## 2023-08-08 NOTE — PLAN OF CARE
Goal Outcome Evaluation:      Plan of Care Reviewed With: patient, spouse    Overall Patient Progress: improvingOverall Patient Progress: improving    Outcome Evaluation: Arrived to Unit from PACU @1400.    Patient vital signs are at baseline: Yes  Patient able to ambulate as they were prior to admission or with assist devices provided by therapies during their stay:  Yes  Patient MUST void prior to discharge:  Yes  Patient able to tolerate oral intake:  Yes  Pain has adequate pain control using Oral analgesics:  Yes  Does patient have an identified :  Yes   Spouse  Has goal D/C date and time been discussed with patient:  Yes   Plan to Discharge to Home w/ Spouse 08/09/2023.    A&Ox4.   CMS Intact.   VSS on RA.   Up with Ax1 GB and walker.   Voiding in BR.   Pain controlled with Oxycodone, Atarax, Tylenol.   Continue to monitor.

## 2023-08-08 NOTE — ANESTHESIA CARE TRANSFER NOTE
Patient: Mp Garay    Procedure: Procedure(s):  LEFT OPEN TOTAL HIP ARTHROPLASTY       Diagnosis: Primary osteoarthritis of left hip [M16.12]  Diagnosis Additional Information: No value filed.    Anesthesia Type:   Spinal     Note:    Oropharynx: oropharynx clear of all foreign objects and spontaneously breathing  Level of Consciousness: awake  Oxygen Supplementation: room air    Independent Airway: airway patency satisfactory and stable  Dentition: dentition unchanged  Vital Signs Stable: post-procedure vital signs reviewed and stable  Report to RN Given: handoff report given  Patient transferred to: PACU    Handoff Report: Identifed the Patient, Identified the Reponsible Provider, Reviewed the pertinent medical history, Discussed the surgical course, Reviewed Intra-OP anesthesia mangement and issues during anesthesia, Set expectations for post-procedure period and Allowed opportunity for questions and acknowledgement of understanding    Vitals:  Vitals Value Taken Time   /65 08/08/23 1152   Temp     Pulse 85 08/08/23 1153   Resp 22 08/08/23 1153   SpO2 99 % 08/08/23 1153   Vitals shown include unvalidated device data.    Electronically Signed By: VEDA Leger CRNA  August 8, 2023  11:54 AM

## 2023-08-08 NOTE — PLAN OF CARE
08/08/23 1652   Appointment Info   Signing Clinician's Name / Credentials (PT) Sara Prabhakar DPT   Living Environment   People in Home spouse   Transportation Anticipated family or friend will provide   Living Environment Comments 4 [address] home then needs on main level   Self-Care   Equipment Currently Used at Home none   Fall history within last six months no   General Information   Onset of Illness/Injury or Date of Surgery 08/08/23   Referring Physician Rod Christiansen PA-C   Pertinent History of Current Problem (include personal factors and/or comorbidities that impact the POC) L ÁLVARO posterior approach   Existing Precautions/Restrictions fall;no hip ER;no hip ADD past midline;90 degree hip flexion   Cognition   Affect/Mental Status (Cognition) anxious   Cognitive Status Comments Pt anxious during session, needs commands repeated at times   Pain Assessment   Patient Currently in Pain Yes, see Vital Sign flowsheet   Integumentary/Edema   Integumentary/Edema Comments see nursing notes   Range of Motion (ROM)   ROM Comment WFL   Strength (Manual Muscle Testing)   Strength Comments gross functional weakness with OOB mobility   Bed Mobility   Comment, (Bed Mobility) Min A   Transfers   Comment, (Transfers) STS close CGA elevated bed ht, tends to want to IR LLE   Gait/Stairs (Locomotion)   Distance in Feet 3' eval   Comment, (Gait/Stairs) FWW, close CGA, LLE IR, NBOS, short step length   Balance   Balance Comments impaired dynamic balcne from baseline   Sensory Examination   Sensory Perception Comments intact to light touch however dec on LLE compared to RLE this evening post op   Coordination   Coordination Comments impaired LLE coordination with gait   Clinical Impression   Criteria for Skilled Therapeutic Intervention Yes, treatment indicated   PT Diagnosis (PT) impaired IND with mobility from baseline   Influenced by the following impairments functional weakness, pain, impaired balance   Functional limitations  due to impairments impaired IND with mobility from baseline   Clinical Presentation (PT Evaluation Complexity) Stable/Uncomplicated   Clinical Presentation Rationale clinical judgement   Clinical Decision Making (Complexity) low complexity   Planned Therapy Interventions (PT) balance training;bed mobility training;cryotherapy;gait training;home exercise program;patient/family education;stair training;strengthening;transfer training   PT Total Evaluation Time   PT Eval, Low Complexity Minutes (71722) 10   Plan of Care Review   Plan of Care Reviewed With patient   Physical Therapy Goals   PT Frequency 2x/day   PT Predicted Duration/Target Date for Goal Attainment 08/11/23   PT Goals Bed Mobility;Transfers;Gait;Stairs   PT: Bed Mobility Supervision/stand-by assist;Supine to/from sit   PT: Transfers Supervision/stand-by assist;Sit to/from stand;Bed to/from chair;Assistive device   PT: Gait Supervision/stand-by assist;Rolling walker;100 feet   PT: Stairs Supervision/stand-by assist;4 stairs;Assistive device   PT Discharge Planning   PT Plan focus on bed mobility as pt needing inc assist with this, trial leg ? vs wife assisting, transfers, gait, stairs pt has 4 stairs to navigate no railing rec obtaining SEC for stair navigation at VT, we could issue or he could purchase check with pt again, pt has FWW at home   PT Rationale for DC Rec defer to ortho   PT Brief overview of current status pt currently min A x 1 bed mobility, close CGA transfers and gait with FWW, impaired gait mechanics, impaired dynamic balance, pt will need to be SBA for mobility at VT. Wife able to minimally physically assist at VT   Total Session Time   Total Session Time (sum of timed and untimed services) 10

## 2023-08-08 NOTE — OR NURSING
Patient with tremors bilateral hands. MDA at bedside assessing. No concerns at this time.MDA explained to patient that it will get better.

## 2023-08-08 NOTE — CONSULTS
[facility]  BRIEF HOSPITALIST CONSULT NOTE- Hospitalist Service     Date of Admission:  8/8/2023  Consult Requested by: Rod Christiansen PA-C  Reason for Consult: medical management s/p left ÁLVARO, DMII, medication reconciliation   PRIMARY CARE PROVIDER:    Anil Yancey    Assessment & Plan   Mp Garay is a 67 year old male admitted on 8/8/2023.    Past medical history significant for OA, DM2, ED, Prostate cancer who underwent and elective left ÁLVARO.      EMR was reviewed that included pre-op H&P and PTA medications.  Vital signs reviewed (BP slightly elevated at 156/86).  Formal consult will be deferred.  Please see outlined plan below.  Admission and Discharge PTA (Home) medication reconciliation has been completed.  Hospitalist will sign off.  Please call or reconsult if any questions or concerns arise.     OA of the left hip s/p Left ÁLVARO  POD #0.   - Ortho is managing.   --Analgesic/pain management, DVT prophylaxis, PT/OT consultation per Ortho.  - HGB ordered for tomorrow morning.    - Encourage utilization of incentive spirometer.   - Bladder management protocol in place to assess and manage urinary retention.    - Prescribed 60 days of Protonix 40 mg/d as patient will be discharging on ASA and Celebrex.      DM2  Managed PTA on metformin 1000 mg BID.  A1c of 6.9% from 7/25/23.    - Hold PTA metformin and resume at discharge.    - Medium intensity sliding scale insulin.    - Glucose checks ordered.    - Hypoglycemic protocol in place.      ED  - Hold PTA PRN tadalafil 5 mg/d.      Prostate cancer  *Follows with Urology and per pre-op H&P stable.    - Continue with outpatient follow up with Urology.      History of Herpes Zoster  - Hold PTA Valtrex 1000 mg BID (patient has not taken in over a month).  Resume at discharge.      Clinically Significant Risk Factors Present on Admission                                  Diet: NPO per Anesthesia Guidelines for Procedure/Surgery  Except for: Meds     DVT Prophylaxis: Defer to primary service   Miranda Catheter: Not present  Code Status:  per Ortho.     Disposition Plan    Per Ortho.         Curly Contreras PA-C  [facility]  Securely message with the Vocera Web Console (learn more here)  Text page via [employer] Paging/Directory

## 2023-08-08 NOTE — ANESTHESIA POSTPROCEDURE EVALUATION
Patient: Mp Garay    Procedure: Procedure(s):  LEFT OPEN TOTAL HIP ARTHROPLASTY       Anesthesia Type:  Spinal    Note:  Disposition: Admission   Postop Pain Control: Uneventful            Sign Out: Well controlled pain   PONV: No   Neuro/Psych: Uneventful            Sign Out: Acceptable/Baseline neuro status   Airway/Respiratory: Uneventful            Sign Out: Acceptable/Baseline resp. status   CV/Hemodynamics: Uneventful            Sign Out: Acceptable CV status   Other NRE: NONE   DID A NON-ROUTINE EVENT OCCUR?     Event details/Postop Comments:  Recovery from regional anesthesia has not occurred but is anticipated within 48 hours.            Last vitals:  Vitals Value Taken Time   /86 08/08/23 1345   Temp 36.2  C (97.2  F) 08/08/23 1345   Pulse 79 08/08/23 1348   Resp 10 08/08/23 1348   SpO2 96 % 08/08/23 1350   Vitals shown include unvalidated device data.    Electronically Signed By: Geraldo Remy MD  August 8, 2023  2:20 PM

## 2023-08-08 NOTE — PROCEDURES
DATE OF SERVICE: 8/8/2023    SURGEON   MAME KING MD     FIRST ASSISTANT   RORO CHRISTIANSEN PA-C   Please bill for Mr. Christiansen as first assistant as there was no resident available to assist in this case. Assistants were necessary and performed positioning, draping, retraction, suturing and wound dressing tasks throughout the surgical procedure. The assistant was present for the entire procedure.    ASSISTANTS  BRUNO Marin    PREOPERATIVE DIAGNOSIS A  End-stage left hip osteoarthritis.     POSTOPERATIVE DIAGNOSIS   End-stage left hip osteoarthritis.     PROCEDURE   Left total hip arthroplasty using [employer] components, a size 52 cup, a size 36 mm highly cross-linked polyethylene liner, a size 7 high offset Anthology stem and a 0 36 head.     INDICATION FOR SURGERY   Mp Garay [id number] is a 67 year old-year-old male with a long history of increasing left hip pain. Preoperative x-rays showed advanced degenerative changes in the left hip. The patient has failed all reasonable non-operative options and we decided that they would benefit from the above-named procedure. Informed consent was obtained.     FINDINGS   The femoral head had completely flattened out. There was a significant amount of eburnation within the acetabulum as well. There was also significant collapse of the femoral head.     PROCEDURE   Mp Garay was correctly identified by myself in the PAR and their left lower extremity was marked. The patient was taken to the operating room. They were placed under spinal anesthetic. The patient was then placed supine and leg lengths assessed. They were placed in the right lateral decubitus position with the Doyle hip-jacome. The patient was then prepped with Avagard, followed by a 10 minute Betadine scrub, alcohol paint, DuraPrep and draped in the usual fashion. A time-out was then performed. A posterior incision was made and carried down the IT band. The IT band was then split along with  the gluteal musculature. The Omni-Tract retractor was then placed, giving us excellent exposure. We placed a marking stitch at this point to measure length, as well as clinically measured length. The piriformis was identified. This was dissected with a Bovie off the back of the head, as well as with the capsule. The capsule was teed.  The Omni-Tract retractors were then placed deeper. We then dislocated the femoral head and a femoral cut was made one fingerbreadth above the lesser tuberosity. The leg was then placed in approximately 45 degrees of internal rotation and rested on a Blankenship stand.  An anterior hip retractor was placed anteriorly over the rim of the acetabulum, giving us excellent exposure. The cotyloid fossa was removed and followed by reaming medially with a 44 reamer and took this up to a 52 with excellent circumferential bleeding in the 45 degrees of abduction and approximately 15 degrees of anteversion position. The wound was then thoroughly irrigated. We pounded the permanent cup in place and I placed 2 screws and a centerhole cover. The permanent liner was also placed at this time. A Ray-Devendra sponge was placed in the acetabulum. Attention was turned to the femur. The femur was placed perpendicular to the floor. The , followed by a T-handle reamer was then used to open up the femoral canal. A rat-tailed rasp was then used to identify the lateral cortex of the femur. The broaching was started at a zero and taken up to a 7. I lateralized this with a lateralizing broach as much as possible, keeping in line with the patient's natural anteversion. The Calcar reamer was then utilized. The Ray-devendra sponge was removed. We then trialed the head. It was felt that a 0 was the right length and the patient was stable in all ranges of motion. We used the marking stitch to assess leg length, as well as clinical leg lengths, both appeared to be equal. The trial components were removed. The wound and femoral  canal were thoroughly irrigated. About a third of the vancomycin powder was placed within the femoral canal. The permanent 7 stem was impacted into place. The permanent trunion was then thoroughly irrigated, dried and cleaned. The permanent size 36 head was impacted in place. The hip was reduced. The short external rotators and piriformis were repaired back to the posterior femur using a #5 FiberWire x3 through bone tunnels. The rest of the vancomycin powder was placed in the wound and the IT band was closed with a running #2 Quill; 0 Stratafix and 2-0 Stratafix running sutures were used to close the subdermal fatty and subdermal layers respectively, and the skin was closed with a running 3-0 Quill suture. The Prineo dressing was applied. There were no complications. Sponge and needle counts were correct. The patient was taken to the PAR in stable condition.     SPECIMENS  None    COMPLICATIONS  None    ESTIMATED BLOOD LOSS  100 mL    CONDITION ON DISCHARGE FROM OR  Satisfactory    PLAN  1. Antibiotic Prophylaxis was given included Ancef 2 gm. Antibiotics given within 1 hour of surgical incision and discontinued within 24 hrs.   2. DVT prophylaxis ASA given within 24 hours    3. Weight Bearing WBAT (Weight bearing as tolerated).   4. Discharge anticipated date POD# 1 to Home vs Rehab Facility    5. Pain Medication oxycodone, Tylenol and Celebrex  6. Leave Prineo dressing on upon discharge if applied in OR.   7. Routine posterior-approach hip precautions for 6 weeks- no external rotation, no adduction past midline, no hip flexion past 90 degrees.      MAME KING MD    @C(1)@ [facility] - [phone]

## 2023-08-08 NOTE — ANESTHESIA PROCEDURE NOTES
"Intrathecal injection Procedure Note    Pre-Procedure   Staff -        Anesthesiologist:  Geraldo Remy MD       Performed By: anesthesiologist       Location: OR       Pre-Anesthestic Checklist: patient identified, IV checked, risks and benefits discussed, informed consent, monitors and equipment checked and pre-op evaluation  Timeout:       Correct Patient: Yes        Correct Procedure: Yes        Correct Site: Yes        Correct Position: Yes   Procedure Documentation  Procedure: intrathecal injection       Patient Position: sitting       Skin prep: Chloraprep       Insertion Site: L4-5. (midline approach).       Needle Gauge: 25.        Needle Length (Inches): 3.5        Spinal Needle Type: Valarie-Mauricio       Introducer used       Introducer: 20 G       # of attempts: 1 and  # of redirects:     Assessment/Narrative         Paresthesias: No.       CSF fluid: clear.    Medication(s) Administered   2% Mepivacaine PF (Intrathecal) - Intrathecal   2.5 mL - 8/8/2023 10:18:00 AM   Comments:  Patient tolerated well.   Pre/Post aspiration.   No complications.   0.75% Bupivacaine documented on record.      FOR [facility] ([facility]) ONLY:   Pain Team Contact information: please page the Pain Team Via [employer]. Search \"Pain\". During daytime hours, please page the attending first. At night please page the resident first.      "

## 2023-08-09 ENCOUNTER — APPOINTMENT (OUTPATIENT)
Dept: PHYSICAL THERAPY | Facility: CLINIC | Age: 68
End: 2023-08-09
Attending: PHYSICIAN ASSISTANT
Payer: MEDICARE

## 2023-08-09 ENCOUNTER — APPOINTMENT (OUTPATIENT)
Dept: PHYSICAL THERAPY | Facility: CLINIC | Age: 68
End: 2023-08-09
Attending: ORTHOPAEDIC SURGERY
Payer: MEDICARE

## 2023-08-09 ENCOUNTER — APPOINTMENT (OUTPATIENT)
Dept: OCCUPATIONAL THERAPY | Facility: CLINIC | Age: 68
End: 2023-08-09
Attending: PHYSICIAN ASSISTANT
Payer: MEDICARE

## 2023-08-09 LAB
FASTING STATUS PATIENT QL REPORTED: YES
GLUCOSE BLDC GLUCOMTR-MCNC: 167 MG/DL (ref 70–99)
GLUCOSE BLDC GLUCOMTR-MCNC: 179 MG/DL (ref 70–99)
GLUCOSE BLDC GLUCOMTR-MCNC: 190 MG/DL (ref 70–99)
GLUCOSE BLDC GLUCOMTR-MCNC: 207 MG/DL (ref 70–99)
GLUCOSE BLDC GLUCOMTR-MCNC: 213 MG/DL (ref 70–99)
GLUCOSE BLDC GLUCOMTR-MCNC: 241 MG/DL (ref 70–99)
GLUCOSE SERPL-MCNC: 164 MG/DL (ref 70–99)
HGB BLD-MCNC: 11.3 G/DL (ref 13.3–17.7)

## 2023-08-09 PROCEDURE — 97116 GAIT TRAINING THERAPY: CPT | Mod: GP,CQ | Performed by: PHYSICAL THERAPY ASSISTANT

## 2023-08-09 PROCEDURE — 36415 COLL VENOUS BLD VENIPUNCTURE: CPT | Performed by: PHYSICIAN ASSISTANT

## 2023-08-09 PROCEDURE — 82947 ASSAY GLUCOSE BLOOD QUANT: CPT | Mod: 91 | Performed by: ORTHOPAEDIC SURGERY

## 2023-08-09 PROCEDURE — 250N000013 HC RX MED GY IP 250 OP 250 PS 637: Performed by: PHYSICIAN ASSISTANT

## 2023-08-09 PROCEDURE — 250N000011 HC RX IP 250 OP 636: Mod: JZ | Performed by: PHYSICIAN ASSISTANT

## 2023-08-09 PROCEDURE — 97110 THERAPEUTIC EXERCISES: CPT | Mod: GP,CQ | Performed by: PHYSICAL THERAPY ASSISTANT

## 2023-08-09 PROCEDURE — 85018 HEMOGLOBIN: CPT | Performed by: PHYSICIAN ASSISTANT

## 2023-08-09 PROCEDURE — 82962 GLUCOSE BLOOD TEST: CPT

## 2023-08-09 PROCEDURE — 97530 THERAPEUTIC ACTIVITIES: CPT | Mod: GP,CQ | Performed by: PHYSICAL THERAPY ASSISTANT

## 2023-08-09 PROCEDURE — 97165 OT EVAL LOW COMPLEX 30 MIN: CPT | Mod: GO

## 2023-08-09 PROCEDURE — 97535 SELF CARE MNGMENT TRAINING: CPT | Mod: GO

## 2023-08-09 RX ORDER — HYDROXYZINE HYDROCHLORIDE 10 MG/1
10 TABLET, FILM COATED ORAL EVERY 6 HOURS PRN
Qty: 30 TABLET | Refills: 0 | Status: SHIPPED | OUTPATIENT
Start: 2023-08-09

## 2023-08-09 RX ADMIN — CEFAZOLIN SODIUM 2 G: 2 INJECTION, SOLUTION INTRAVENOUS at 01:13

## 2023-08-09 RX ADMIN — INSULIN ASPART 1 UNITS: 100 INJECTION, SOLUTION INTRAVENOUS; SUBCUTANEOUS at 08:11

## 2023-08-09 RX ADMIN — SENNOSIDES AND DOCUSATE SODIUM 1 TABLET: 50; 8.6 TABLET ORAL at 08:05

## 2023-08-09 RX ADMIN — OXYCODONE HYDROCHLORIDE 5 MG: 5 TABLET ORAL at 13:19

## 2023-08-09 RX ADMIN — HYDROXYZINE HYDROCHLORIDE 10 MG: 10 TABLET ORAL at 08:31

## 2023-08-09 RX ADMIN — HYDROXYZINE HYDROCHLORIDE 10 MG: 10 TABLET ORAL at 01:43

## 2023-08-09 RX ADMIN — HYDROXYZINE HYDROCHLORIDE 10 MG: 10 TABLET ORAL at 16:39

## 2023-08-09 RX ADMIN — ACETAMINOPHEN 975 MG: 325 TABLET, FILM COATED ORAL at 08:05

## 2023-08-09 RX ADMIN — SENNOSIDES AND DOCUSATE SODIUM 1 TABLET: 50; 8.6 TABLET ORAL at 21:23

## 2023-08-09 RX ADMIN — POLYETHYLENE GLYCOL 3350 17 G: 17 POWDER, FOR SOLUTION ORAL at 08:05

## 2023-08-09 RX ADMIN — ACETAMINOPHEN 975 MG: 325 TABLET, FILM COATED ORAL at 16:39

## 2023-08-09 RX ADMIN — CELECOXIB 100 MG: 100 CAPSULE ORAL at 08:05

## 2023-08-09 RX ADMIN — INSULIN ASPART 2 UNITS: 100 INJECTION, SOLUTION INTRAVENOUS; SUBCUTANEOUS at 16:57

## 2023-08-09 RX ADMIN — ACETAMINOPHEN 975 MG: 325 TABLET, FILM COATED ORAL at 01:12

## 2023-08-09 RX ADMIN — CELECOXIB 100 MG: 100 CAPSULE ORAL at 21:24

## 2023-08-09 RX ADMIN — ASPIRIN 162 MG: 81 TABLET, COATED ORAL at 08:05

## 2023-08-09 RX ADMIN — INSULIN ASPART 2 UNITS: 100 INJECTION, SOLUTION INTRAVENOUS; SUBCUTANEOUS at 12:51

## 2023-08-09 ASSESSMENT — ACTIVITIES OF DAILY LIVING (ADL)
ADLS_ACUITY_SCORE: 22
ADLS_ACUITY_SCORE: 21
ADLS_ACUITY_SCORE: 22
ADLS_ACUITY_SCORE: 22
ADLS_ACUITY_SCORE: 21
ADLS_ACUITY_SCORE: 22
ADLS_ACUITY_SCORE: 22
ADLS_ACUITY_SCORE: 21

## 2023-08-09 NOTE — PLAN OF CARE
Goal Outcome Evaluation:    Patient vital signs are at baseline: Yes  Patient able to ambulate as they were prior to admission or with assist devices provided by therapies during their stay:  Yes Ax1 GB and walker  Patient MUST void prior to discharge:  Yes Voiding in BR.   Patient able to tolerate oral intake:  Yes  Pain has adequate pain control using Oral analgesics:  Yes  Does patient have an identified :  Yes   Spouse  Has goal D/C date and time been discussed with patient:  Yes   Plan to Discharge to Home w/ Spouse 08/09/2023.     Info: A&Ox4, CMS Intact,  RA, Pain controlled with Oxycodone, Atarax, Tylenol, will continue to continue to monitor.

## 2023-08-09 NOTE — PROGRESS NOTES
"ORTHOPEDIC LOWER EXTREMITY PROGRESS NOTE    POD#1  Patient is a 67 year old male who underwent Procedure(s):  LEFT OPEN TOTAL HIP ARTHROPLASTY on 2023. Pain is well controlled. Tolerating medication well, no nausea or vomiting.  Atarax worked well for him and he would like this at discharge.  No events overnight. Discharge instructions reviewed.    Vitals:   Blood pressure (!) 150/79, pulse 74, temperature 98  F (36.7  C), resp. rate 16, height 1.702 m (5' 7\"), weight 69.4 kg (152 lb 14.4 oz), SpO2 96 %.  Temp (24hrs), Av.8  F (36.6  C), Min:96.8  F (36  C), Max:99.2  F (37.3  C)      Drains: None    EXAM   The patient is awake and alert.  Calves are soft and non-tender.   Sensation is intact.  Dorsiflexion and plantar flexion is intact.  Foot warm with nl cap refill.  The incision is covered with prineo dressing,  CDI .     Labs:   Recent Labs   Lab Test 23  0749   HGB 11.3*       ASSESSMENT  S/p L ÁLVARO   PLAN  1. DVT prophylaxis: aspirin  2. Weight Bearing: WBAT (Weight bearing as tolerated).  3. Anticipated discharge date today . Discharge to Home with No Skilled Service.  4. Cont Pain Control Oxycodone, Tylenol, Celebrex, and Atarax    Maranda Pugh PA-C  [facility]        "

## 2023-08-09 NOTE — PROGRESS NOTES
08/09/23 1100   Appointment Info   Signing Clinician's Name / Credentials (OT) Verna Shay, OTR/L   Rehab Comments (OT) Initial OT Eval   Living Environment   People in Home spouse   Transportation Anticipated family or friend will provide   Living Environment Comments tub shower combo. stairs   Self-Care   Usual Activity Tolerance moderate   Current Activity Tolerance fair   Equipment Currently Used at Home commode chair;other (see comments)  (sock aid and shoe horn, LH shower brush)   Fall history within last six months no   Activity/Exercise/Self-Care Comment indp baseline   Instrumental Activities of Daily Living (IADL)   IADL Comments pt and wife both complete in the home at baseline   General Information   Onset of Illness/Injury or Date of Surgery 08/08/23   Referring Physician Rod Christiansen PA-C   Patient/Family Therapy Goal Statement (OT) to go home   Additional Occupational Profile Info/Pertinent History of Current Problem Past medical history significant for OA, DM2, ED, Prostate cancer who underwent and elective left ÁLVARO. POD 1   Existing Precautions/Restrictions weight bearing;no hip ER;no hip ADD past midline;90 degree hip flexion;no pivoting or twisting   Left Lower Extremity (Weight-bearing Status) weight-bearing as tolerated (WBAT)   General Observations and Info agreeable to OT eval   Cognitive Status Examination   Orientation Status orientation to person, place and time   Visual Perception   Visual Impairment/Limitations WFL   Sensory   Sensory Quick Adds sensation intact   Pain Assessment   Patient Currently in Pain Yes, see Vital Sign flowsheet   Posture   Posture protracted shoulders;forward head position   Range of Motion Comprehensive   General Range of Motion bilateral upper extremity ROM WNL   Comment, General Range of Motion surgical leg ROM deficits and precautions   Strength Comprehensive (MMT)   General Manual Muscle Testing (MMT) Assessment no strength deficits identified    Comment, General Manual Muscle Testing (MMT) Assessment surigcal leg ROM deficits and precautions   Muscle Tone Assessment   Muscle Tone Quick Adds No deficits were identified   Coordination   Upper Extremity Coordination No deficits were identified   Bed Mobility   Bed Mobility supine-sit   Supine-Sit Jewell (Bed Mobility) minimum assist (75% patient effort)   Transfers   Transfers sit-stand transfer   Sit-Stand Transfer   Sit-Stand Jewell (Transfers) minimum assist (75% patient effort)   Activities of Daily Living   BADL Assessment/Intervention bathing;upper body dressing;lower body dressing;toileting   Bathing Assessment/Intervention   Jewell Level (Bathing) minimum assist (75% patient effort)   Upper Body Dressing Assessment/Training   Jewell Level (Upper Body Dressing) minimum assist (75% patient effort)   Lower Body Dressing Assessment/Training   Jewell Level (Lower Body Dressing) minimum assist (75% patient effort)   Toileting   Jewell Level (Toileting) minimum assist (75% patient effort)   Clinical Impression   Criteria for Skilled Therapeutic Interventions Met (OT) Yes, treatment indicated   OT Diagnosis decreased function in ADL   OT Problem List-Impairments impacting ADL problems related to;activity tolerance impaired;mobility;post-surgical precautions;pain   Assessment of Occupational Performance 3-5 Performance Deficits   Identified Performance Deficits bathing, dressing, toileting, transfers, mobility, IADL   Planned Therapy Interventions (OT) ADL retraining;IADL retraining;transfer training;progressive activity/exercise   Clinical Decision Making Complexity (OT) low complexity   Anticipated Equipment Needs Upon Discharge (OT) reacher;tub bench   Risk & Benefits of therapy have been explained evaluation/treatment results reviewed;care plan/treatment goals reviewed;risks/benefits reviewed;current/potential barriers reviewed;participants voiced agreement with care  plan;participants included;patient   Clinical Impression Comments decreased function in ADL Warrants skilled therapy   OT Total Evaluation Time   OT Leeroy, Low Complexity Minutes (74495) 15   OT Goals   Therapy Frequency (OT) Daily   OT Predicted Duration/Target Date for Goal Attainment 08/16/23   OT Goals Hygiene/Grooming;Upper Body Dressing;Lower Body Dressing;Transfers;Toilet Transfer/Toileting   OT: Hygiene/Grooming modified independent;within precautions   OT: Upper Body Dressing Modified independent;within precautions   OT: Lower Body Dressing Modified independent;within precautions  (with AE)   OT: Transfer Modified independent;within precautions   OT: Toilet Transfer/Toileting Modified independent;toilet transfer;cleaning and garment management;using adaptive equipment;within precautions   OT Discharge Planning   OT Plan LB dressing with AE. review tub shower bench rec and try to simulate. bed mobility with leg  or belt   OT Rationale for DC Rec defer to ortho. pt will need a reacher and extended tub bench for discharge. requires assist with bed mobility, toilet tx. defer mobility to PT. wife reports unable to assist at home   Total Session Time   Total Session Time (sum of timed and untimed services) 15

## 2023-08-09 NOTE — PROVIDER NOTIFICATION
"MD Notification    Notified Person: MD    Notified Person Name: Maranda Pugh    Notification Date/Time: 08/09/23   12:31 PM      Notification Interaction: vocera     Purpose of Notification: FYI: pt's wife called the  and said she's leaving and not coming back until tomorrow to pick him up. She was going to try and get a hold of doctor Lucho. He just worked with OT and she said he did well, just feels \"foggy\"     Orders Received: Pt will discharge tomorrow    Comments:    "

## 2023-08-10 ENCOUNTER — APPOINTMENT (OUTPATIENT)
Dept: OCCUPATIONAL THERAPY | Facility: CLINIC | Age: 68
End: 2023-08-10
Attending: ORTHOPAEDIC SURGERY
Payer: MEDICARE

## 2023-08-10 ENCOUNTER — APPOINTMENT (OUTPATIENT)
Dept: PHYSICAL THERAPY | Facility: CLINIC | Age: 68
End: 2023-08-10
Attending: ORTHOPAEDIC SURGERY
Payer: MEDICARE

## 2023-08-10 VITALS
HEIGHT: 67 IN | OXYGEN SATURATION: 97 % | WEIGHT: 152.9 LBS | TEMPERATURE: 97.7 F | RESPIRATION RATE: 16 BRPM | DIASTOLIC BLOOD PRESSURE: 74 MMHG | HEART RATE: 71 BPM | SYSTOLIC BLOOD PRESSURE: 148 MMHG | BODY MASS INDEX: 24 KG/M2

## 2023-08-10 LAB
FASTING STATUS PATIENT QL REPORTED: YES
GLUCOSE BLDC GLUCOMTR-MCNC: 183 MG/DL (ref 70–99)
GLUCOSE BLDC GLUCOMTR-MCNC: 183 MG/DL (ref 70–99)
GLUCOSE SERPL-MCNC: 159 MG/DL (ref 70–99)
HGB BLD-MCNC: 10 G/DL (ref 13.3–17.7)

## 2023-08-10 PROCEDURE — 97116 GAIT TRAINING THERAPY: CPT | Mod: GP,CQ | Performed by: PHYSICAL THERAPY ASSISTANT

## 2023-08-10 PROCEDURE — 97535 SELF CARE MNGMENT TRAINING: CPT | Mod: GO

## 2023-08-10 PROCEDURE — 36415 COLL VENOUS BLD VENIPUNCTURE: CPT | Performed by: PHYSICIAN ASSISTANT

## 2023-08-10 PROCEDURE — 250N000013 HC RX MED GY IP 250 OP 250 PS 637: Performed by: PHYSICIAN ASSISTANT

## 2023-08-10 PROCEDURE — 97530 THERAPEUTIC ACTIVITIES: CPT | Mod: GP,CQ | Performed by: PHYSICAL THERAPY ASSISTANT

## 2023-08-10 PROCEDURE — 82962 GLUCOSE BLOOD TEST: CPT

## 2023-08-10 PROCEDURE — 82947 ASSAY GLUCOSE BLOOD QUANT: CPT | Performed by: ORTHOPAEDIC SURGERY

## 2023-08-10 PROCEDURE — 97110 THERAPEUTIC EXERCISES: CPT | Mod: GP,CQ | Performed by: PHYSICAL THERAPY ASSISTANT

## 2023-08-10 PROCEDURE — 85018 HEMOGLOBIN: CPT | Performed by: PHYSICIAN ASSISTANT

## 2023-08-10 RX ADMIN — CELECOXIB 100 MG: 100 CAPSULE ORAL at 08:03

## 2023-08-10 RX ADMIN — ASPIRIN 162 MG: 81 TABLET, COATED ORAL at 08:03

## 2023-08-10 RX ADMIN — POLYETHYLENE GLYCOL 3350 17 G: 17 POWDER, FOR SOLUTION ORAL at 08:02

## 2023-08-10 RX ADMIN — SENNOSIDES AND DOCUSATE SODIUM 1 TABLET: 50; 8.6 TABLET ORAL at 08:03

## 2023-08-10 RX ADMIN — INSULIN ASPART 1 UNITS: 100 INJECTION, SOLUTION INTRAVENOUS; SUBCUTANEOUS at 08:03

## 2023-08-10 RX ADMIN — ACETAMINOPHEN 975 MG: 325 TABLET, FILM COATED ORAL at 00:04

## 2023-08-10 RX ADMIN — HYDROXYZINE HYDROCHLORIDE 10 MG: 10 TABLET ORAL at 00:04

## 2023-08-10 RX ADMIN — ACETAMINOPHEN 975 MG: 325 TABLET, FILM COATED ORAL at 08:02

## 2023-08-10 ASSESSMENT — ACTIVITIES OF DAILY LIVING (ADL)
ADLS_ACUITY_SCORE: 19

## 2023-08-10 NOTE — PROGRESS NOTES
"ORTHOPEDIC LOWER EXTREMITY PROGRESS NOTE    POD#2  Patient is a 67 year old male who underwent Procedure(s):  LEFT OPEN TOTAL HIP ARTHROPLASTY on 2023. Pain is well controlled. Tolerating medication well, no nausea or vomiting.  Feeling better today and got a good night's sleep.  Discharge instructions reviewed.    Vitals:   Blood pressure (!) 148/74, pulse 71, temperature 97.7  F (36.5  C), temperature source Oral, resp. rate 16, height 1.702 m (5' 7\"), weight 69.4 kg (152 lb 14.4 oz), SpO2 97 %.  Temp (24hrs), Av.8  F (36.6  C), Min:96.8  F (36  C), Max:99.2  F (37.3  C)      Drains: None    EXAM   The patient is awake and alert.  Calves are soft and non-tender.   Sensation is intact.  Dorsiflexion and plantar flexion is intact.  Foot warm with nl cap refill.  The incision is covered with prineo dressing,  CDI .     Labs:   Recent Labs   Lab Test 08/10/23  0555 23  0749   HGB 10.0* 11.3*         ASSESSMENT  S/p L ÁLVARO   PLAN  1. DVT prophylaxis: aspirin  2. Weight Bearing: WBAT (Weight bearing as tolerated).  3. Anticipated discharge date today . Discharge to Home with No Skilled Service.  4. Cont Pain Control Oxycodone, Tylenol, Celebrex, and Atarax    Maranda Pugh PA-C  [facility]        "

## 2023-08-10 NOTE — PLAN OF CARE
Patient vital signs are at baseline: Yes  Patient able to ambulate as they were prior to admission or with assist devices provided by therapies during their stay:  Yes  Patient MUST void prior to discharge:  Yes  Patient able to tolerate oral intake:  Yes  Pain has adequate pain control using Oral analgesics:  Yes  Does patient have an identified :  Yes  Has goal D/C date and time been discussed with patient:  Yes    Up with SBA and walker. Po ok. Pain well controlled with tylenol and atarax. Hip precautions maintained. Denies lightheadedness this evening. Incision clean and dry. Anticipating discharge tomorrow.

## 2023-08-10 NOTE — PLAN OF CARE
Goal Outcome Evaluation:      Plan of Care Reviewed With: patient, spouse    Overall Patient Progress: improvingOverall Patient Progress: improving    Outcome Evaluation: Discharge to Home w/ Spouse 08/10/2023.    Patient vital signs are at baseline: Yes  Patient able to ambulate as they were prior to admission or with assist devices provided by therapies during their stay:  Yes  Patient MUST void prior to discharge:  Yes  Patient able to tolerate oral intake:  Yes  Pain has adequate pain control using Oral analgesics:  Yes  Does patient have an identified :  Yes  Has goal D/C date and time been discussed with patient:  Yes    A&Ox4.   CMS Intact.   VSS on RA.   Up with Ax1 GB and walker.   Voiding in BR.   Pain controlled with Oxycodone, Tylenol.   Continue to monitor.    Reviewed discharge instructions and medications with patient and spouse:YES  Questions answered:YES  Patient discharged to:Home w/ Spouse  All belongs discharged with patient:YES

## 2023-08-10 NOTE — PLAN OF CARE
Goal Outcome Evaluation:    Patient vital signs are at baseline: Yes  Patient able to ambulate as they were prior to admission or with assist devices provided by therapies during their stay:  Yes  Patient MUST void prior to discharge:  Yes  Patient able to tolerate oral intake:  Yes  Pain has adequate pain control using Oral analgesics:  Yes  Does patient have an identified :  Yes  Has goal D/C date and time been discussed with patient:  Yes     A/O x4. CMS intact. Dressing CDI. PIV sl. Plan to discharge today. BG 0200 183 mg/dl. Hip precaution maintained.

## 2023-08-10 NOTE — PLAN OF CARE
Occupational Therapy Discharge Summary    Reason for therapy discharge:    All goals and outcomes met, no further needs identified.    Progress towards therapy goal(s). See goals on Care Plan in [facility] electronic health record for goal details.  Goals met    Therapy recommendation(s):    Pt functioning near baseline level, noted increased pain. Pt mobilizing and completes self cares with SBA and FWW, within precautions. Pt reports plans to sponge bathe, spouse agreeable to A with LB dressing until reacher is obtained. Pt with all other AE needs met, no further acute OT needs. Acute OT to sign off.

## 2023-08-30 NOTE — PLAN OF CARE
Patient vital signs are at baseline: Yes  Patient able to ambulate as they were prior to admission or with assist devices provided by therapies during their stay:  Yes; A1-GB/W  Patient MUST void prior to discharge:  Yes  Patient able to tolerate oral intake:  Yes  Pain has adequate pain control using Oral analgesics:  Yes  Does patient have an identified :  Yes  Has goal D/C date and time been discussed with patient:  Yes     Pt A&Ox4. VSS on RA. Pain managed with ice, scheduled tylenol, and PRN atarax x1 and oxy x1. A1-GB/W. Episode of dizziness when ambulating on stairs with PT this AM, /70, . Did better this afternoon with PT. Cont-B/B; diarrhea today. Tolerating regular diet. L hip dressing JUANJOSE. CMS intact. PT/OT following. Pt will discharge tomorrow.    Detail Level: Generalized General Sunscreen Counseling: I recommended a broad spectrum sunscreen with a SPF of 30 or higher.  I explained that SPF 30 sunscreens block approximately 97 percent of the sun's harmful rays.  Sunscreens should be applied at least 15 minutes prior to expected sun exposure and then every 2 hours after that as long as sun exposure continues. If swimming or exercising sunscreen should be reapplied every 45 minutes to an hour after getting wet or sweating.  One ounce, or the equivalent of a shot glass full of sunscreen, is adequate to protect the skin not covered by a bathing suit. I also recommended a lip balm with a sunscreen as well. Sun protective clothing can be used in lieu of sunscreen but must be worn the entire time you are exposed to the sun's rays.

## 2024-06-08 ENCOUNTER — HEALTH MAINTENANCE LETTER (OUTPATIENT)
Age: 69
End: 2024-06-08

## 2025-06-15 ENCOUNTER — HEALTH MAINTENANCE LETTER (OUTPATIENT)
Age: 70
End: 2025-06-15

## (undated) DEVICE — GLOVE SENSICARE PI MICRO PF 8.0 LATEX FREE MSG9680

## (undated) DEVICE — SOLUTION WOUND CLEANSING 3/4OZ 10% PVP EA-L3011FB-50

## (undated) DEVICE — SUCTION IRR SYSTEM W/O TIP INTERPULSE HANDPIECE 0210-100-000

## (undated) DEVICE — SOL NACL 0.9% IRRIG 3000ML BAG 07972-08

## (undated) DEVICE — SOL NACL 0.9% IRRIG 1000ML BOTTLE 2F7124

## (undated) DEVICE — SU ETHICON STRATAFIX SPR PS 3-0 30X30CM SXMP2B412

## (undated) DEVICE — HOOD SURG T7PLUS PEEL AWAY FACE SHIELD STRL LF 0416-801-100

## (undated) DEVICE — SU ETHICON STRATAFIX SPR PS-2 3-0 30X30CM SXMP2B408

## (undated) DEVICE — PAD FOAM MCGUIRE KIT 0814-0150

## (undated) DEVICE — DRILL BIT S&N FLEXIBLE REFLECTION 50MM 71362950

## (undated) DEVICE — SU DERMABOND PRINEO 22CM CLR222US

## (undated) DEVICE — BLADE SAW SAGITTAL STRK 18X90X1.27MM HD SYS 6 6118-127-090

## (undated) DEVICE — PACK TOTAL HIP W/POUCH SOP15HPFSM

## (undated) DEVICE — MANIFOLD NEPTUNE 4 PORT 700-20

## (undated) DEVICE — SOL WATER IRRIG 1000ML BOTTLE 2F7114

## (undated) DEVICE — DEVICE RETRIEVER HEWSON 71111579

## (undated) DEVICE — GLOVE BIOGEL PI MICRO INDICATOR UNDERGLOVE SZ 8.0 48980

## (undated) DEVICE — BONE CLEANING TIP INTERPULSE  0210-010-000

## (undated) DEVICE — SU STRATAFIX PDS PLUS 0 CT 45CM SXPP1A406

## (undated) DEVICE — SU ETHIBOND 1 CT-1 30" X425H

## (undated) DEVICE — SYR 30ML LL W/O NDL 302832

## (undated) DEVICE — ESU GROUND PAD UNIVERSAL W/O CORD

## (undated) DEVICE — LINEN TOWEL PACK X5 5464

## (undated) DEVICE — SU PDO 1 STRATAFIX 36X36CM CTX TAPERPOINT SXPD2B405

## (undated) DEVICE — DRAPE IOBAN INCISE 23X17" 6650EZ

## (undated) DEVICE — SU FIBERWIRE 5 CCS-1 BLUE  AR-7211

## (undated) DEVICE — SU STRATAFIX MONOCRYL 2-0 SPIRAL SH 20CM SXMP1B409

## (undated) DEVICE — NDL SPINAL 18GA 3.5" 405184

## (undated) DEVICE — GLOVE BIOGEL PI ORTHOPRO SZ 8.5 47685

## (undated) DEVICE — PREP SKIN SCRUB TRAY 4461A

## (undated) RX ORDER — VANCOMYCIN HYDROCHLORIDE 1 G/20ML
INJECTION, POWDER, LYOPHILIZED, FOR SOLUTION INTRAVENOUS
Status: DISPENSED
Start: 2023-08-08

## (undated) RX ORDER — FENTANYL CITRATE 0.05 MG/ML
INJECTION, SOLUTION INTRAMUSCULAR; INTRAVENOUS
Status: DISPENSED
Start: 2023-08-08

## (undated) RX ORDER — PROPOFOL 10 MG/ML
INJECTION, EMULSION INTRAVENOUS
Status: DISPENSED
Start: 2023-08-08

## (undated) RX ORDER — ACETAMINOPHEN 325 MG/1
TABLET ORAL
Status: DISPENSED
Start: 2023-08-08

## (undated) RX ORDER — CELECOXIB 200 MG/1
CAPSULE ORAL
Status: DISPENSED
Start: 2023-08-08

## (undated) RX ORDER — TRANEXAMIC ACID 650 MG/1
TABLET ORAL
Status: DISPENSED
Start: 2023-08-08